# Patient Record
Sex: FEMALE | Race: WHITE | NOT HISPANIC OR LATINO
[De-identification: names, ages, dates, MRNs, and addresses within clinical notes are randomized per-mention and may not be internally consistent; named-entity substitution may affect disease eponyms.]

---

## 2017-01-27 ENCOUNTER — RX RENEWAL (OUTPATIENT)
Age: 27
End: 2017-01-27

## 2017-01-30 ENCOUNTER — RX RENEWAL (OUTPATIENT)
Age: 27
End: 2017-01-30

## 2017-02-08 ENCOUNTER — RX RENEWAL (OUTPATIENT)
Age: 27
End: 2017-02-08

## 2017-03-02 ENCOUNTER — APPOINTMENT (OUTPATIENT)
Dept: FAMILY MEDICINE | Facility: CLINIC | Age: 27
End: 2017-03-02

## 2017-03-02 ENCOUNTER — LABORATORY RESULT (OUTPATIENT)
Age: 27
End: 2017-03-02

## 2017-03-02 VITALS
HEIGHT: 69 IN | SYSTOLIC BLOOD PRESSURE: 132 MMHG | TEMPERATURE: 97.9 F | WEIGHT: 199 LBS | HEART RATE: 119 BPM | BODY MASS INDEX: 29.47 KG/M2 | DIASTOLIC BLOOD PRESSURE: 72 MMHG | OXYGEN SATURATION: 98 % | RESPIRATION RATE: 16 BRPM

## 2017-03-02 DIAGNOSIS — Z13.29 ENCOUNTER FOR SCREENING FOR OTHER SUSPECTED ENDOCRINE DISORDER: ICD-10-CM

## 2017-03-02 DIAGNOSIS — Z13.228 ENCOUNTER FOR SCREENING FOR OTHER SUSPECTED ENDOCRINE DISORDER: ICD-10-CM

## 2017-03-02 DIAGNOSIS — Z13.0 ENCOUNTER FOR SCREENING FOR OTHER SUSPECTED ENDOCRINE DISORDER: ICD-10-CM

## 2017-03-02 DIAGNOSIS — R53.83 OTHER FATIGUE: ICD-10-CM
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
## 2017-03-03 ENCOUNTER — OTHER (OUTPATIENT)
Age: 27
End: 2017-03-03

## 2017-03-06 ENCOUNTER — RX RENEWAL (OUTPATIENT)
Age: 27
End: 2017-03-06

## 2017-03-06 LAB
25(OH)D3 SERPL-MCNC: 17.6 NG/ML
ALBUMIN SERPL ELPH-MCNC: 4.8 G/DL
ALP BLD-CCNC: 63 U/L
ALT SERPL-CCNC: 10 U/L
ANA SER IF-ACNC: NEGATIVE
ANION GAP SERPL CALC-SCNC: 17 MMOL/L
AST SERPL-CCNC: 18 U/L
B BURGDOR IGG+IGM SER QL IB: NORMAL
BASOPHILS # BLD AUTO: 0.03 K/UL
BASOPHILS NFR BLD AUTO: 0.4 %
BILIRUB SERPL-MCNC: 0.2 MG/DL
BUN SERPL-MCNC: 18 MG/DL
CALCIUM SERPL-MCNC: 9.5 MG/DL
CCP AB SER IA-ACNC: <8 UNITS
CHLORIDE SERPL-SCNC: 100 MMOL/L
CHOLEST SERPL-MCNC: 161 MG/DL
CHOLEST/HDLC SERPL: 2.1 RATIO
CO2 SERPL-SCNC: 24 MMOL/L
CREAT SERPL-MCNC: 0.86 MG/DL
EOSINOPHIL # BLD AUTO: 0.01 K/UL
EOSINOPHIL NFR BLD AUTO: 0.1 %
ERYTHROCYTE [SEDIMENTATION RATE] IN BLOOD BY WESTERGREN METHOD: 18 MM/HR
GLUCOSE SERPL-MCNC: 104 MG/DL
HCT VFR BLD CALC: 42.2 %
HDLC SERPL-MCNC: 77 MG/DL
HGB BLD-MCNC: 14.1 G/DL
IMM GRANULOCYTES NFR BLD AUTO: 0.2 %
LDLC SERPL CALC-MCNC: 74 MG/DL
LYMPHOCYTES # BLD AUTO: 1.31 K/UL
LYMPHOCYTES NFR BLD AUTO: 16 %
MAN DIFF?: NORMAL
MCHC RBC-ENTMCNC: 31.5 PG
MCHC RBC-ENTMCNC: 33.4 GM/DL
MCV RBC AUTO: 94.4 FL
MONOCYTES # BLD AUTO: 0.53 K/UL
MONOCYTES NFR BLD AUTO: 6.5 %
NEUTROPHILS # BLD AUTO: 6.31 K/UL
NEUTROPHILS NFR BLD AUTO: 76.8 %
PLATELET # BLD AUTO: 393 K/UL
POTASSIUM SERPL-SCNC: 4.2 MMOL/L
PROT SERPL-MCNC: 8 G/DL
RBC # BLD: 4.47 M/UL
RBC # FLD: 13.4 %
RF+CCP IGG SER-IMP: NEGATIVE
RHEUMATOID FACT SER QL: <7 IU/ML
SODIUM SERPL-SCNC: 141 MMOL/L
T4 SERPL-MCNC: 7.4 UG/DL
TRIGL SERPL-MCNC: 51 MG/DL
TSH SERPL-ACNC: 0.75 UIU/ML
URATE SERPL-MCNC: 2.1 MG/DL
VIT B12 SERPL-MCNC: 581 PG/ML
WBC # FLD AUTO: 8.21 K/UL

## 2017-03-15 ENCOUNTER — RX RENEWAL (OUTPATIENT)
Age: 27
End: 2017-03-15

## 2017-03-27 ENCOUNTER — RX RENEWAL (OUTPATIENT)
Age: 27
End: 2017-03-27

## 2017-04-03 ENCOUNTER — RX RENEWAL (OUTPATIENT)
Age: 27
End: 2017-04-03

## 2017-04-10 ENCOUNTER — RX RENEWAL (OUTPATIENT)
Age: 27
End: 2017-04-10

## 2017-04-24 ENCOUNTER — RX RENEWAL (OUTPATIENT)
Age: 27
End: 2017-04-24

## 2017-05-02 ENCOUNTER — APPOINTMENT (OUTPATIENT)
Dept: FAMILY MEDICINE | Facility: CLINIC | Age: 27
End: 2017-05-02

## 2017-05-02 VITALS
HEIGHT: 69 IN | BODY MASS INDEX: 29.62 KG/M2 | WEIGHT: 200 LBS | SYSTOLIC BLOOD PRESSURE: 140 MMHG | TEMPERATURE: 98.4 F | DIASTOLIC BLOOD PRESSURE: 84 MMHG

## 2017-05-04 ENCOUNTER — RX RENEWAL (OUTPATIENT)
Age: 27
End: 2017-05-04

## 2017-05-07 ENCOUNTER — RX RENEWAL (OUTPATIENT)
Age: 27
End: 2017-05-07

## 2017-05-08 ENCOUNTER — RX RENEWAL (OUTPATIENT)
Age: 27
End: 2017-05-08

## 2017-05-09 ENCOUNTER — TRANSCRIPTION ENCOUNTER (OUTPATIENT)
Age: 27
End: 2017-05-09

## 2017-05-09 ENCOUNTER — RX RENEWAL (OUTPATIENT)
Age: 27
End: 2017-05-09

## 2017-05-10 ENCOUNTER — RX RENEWAL (OUTPATIENT)
Age: 27
End: 2017-05-10

## 2017-05-16 ENCOUNTER — RESULT REVIEW (OUTPATIENT)
Age: 27
End: 2017-05-16

## 2017-05-17 ENCOUNTER — RESULT REVIEW (OUTPATIENT)
Age: 27
End: 2017-05-17

## 2017-05-25 ENCOUNTER — APPOINTMENT (OUTPATIENT)
Dept: FAMILY MEDICINE | Facility: CLINIC | Age: 27
End: 2017-05-25

## 2017-05-25 VITALS
TEMPERATURE: 98.7 F | BODY MASS INDEX: 29.62 KG/M2 | WEIGHT: 200 LBS | HEIGHT: 69 IN | SYSTOLIC BLOOD PRESSURE: 110 MMHG | DIASTOLIC BLOOD PRESSURE: 64 MMHG

## 2017-05-25 DIAGNOSIS — K52.9 NONINFECTIVE GASTROENTERITIS AND COLITIS, UNSPECIFIED: ICD-10-CM

## 2017-06-20 ENCOUNTER — RX RENEWAL (OUTPATIENT)
Age: 27
End: 2017-06-20

## 2017-06-22 ENCOUNTER — RX RENEWAL (OUTPATIENT)
Age: 27
End: 2017-06-22

## 2017-07-05 ENCOUNTER — RESULT REVIEW (OUTPATIENT)
Age: 27
End: 2017-07-05

## 2017-07-12 ENCOUNTER — RX RENEWAL (OUTPATIENT)
Age: 27
End: 2017-07-12

## 2017-07-12 ENCOUNTER — APPOINTMENT (OUTPATIENT)
Dept: FAMILY MEDICINE | Facility: CLINIC | Age: 27
End: 2017-07-12

## 2017-07-12 VITALS
SYSTOLIC BLOOD PRESSURE: 118 MMHG | HEART RATE: 58 BPM | TEMPERATURE: 98.3 F | OXYGEN SATURATION: 97 % | WEIGHT: 200 LBS | BODY MASS INDEX: 29.62 KG/M2 | HEIGHT: 69 IN | DIASTOLIC BLOOD PRESSURE: 72 MMHG

## 2017-07-12 DIAGNOSIS — Z87.09 PERSONAL HISTORY OF OTHER DISEASES OF THE RESPIRATORY SYSTEM: ICD-10-CM

## 2017-07-24 ENCOUNTER — RX RENEWAL (OUTPATIENT)
Age: 27
End: 2017-07-24

## 2017-07-27 ENCOUNTER — RX RENEWAL (OUTPATIENT)
Age: 27
End: 2017-07-27

## 2017-07-28 ENCOUNTER — RX RENEWAL (OUTPATIENT)
Age: 27
End: 2017-07-28

## 2017-07-29 ENCOUNTER — APPOINTMENT (OUTPATIENT)
Dept: FAMILY MEDICINE | Facility: CLINIC | Age: 27
End: 2017-07-29
Payer: COMMERCIAL

## 2017-07-29 VITALS
OXYGEN SATURATION: 98 % | DIASTOLIC BLOOD PRESSURE: 90 MMHG | HEART RATE: 67 BPM | WEIGHT: 190 LBS | HEIGHT: 69 IN | BODY MASS INDEX: 28.14 KG/M2 | SYSTOLIC BLOOD PRESSURE: 120 MMHG

## 2017-07-29 DIAGNOSIS — T07.XXXA UNSPECIFIED MULTIPLE INJURIES, INITIAL ENCOUNTER: ICD-10-CM

## 2017-07-29 PROCEDURE — 99203 OFFICE O/P NEW LOW 30 MIN: CPT

## 2017-07-29 RX ORDER — IBUPROFEN 800 MG/1
800 TABLET, FILM COATED ORAL
Qty: 90 | Refills: 0 | Status: ACTIVE | COMMUNITY
Start: 2017-07-27

## 2017-08-01 ENCOUNTER — RX RENEWAL (OUTPATIENT)
Age: 27
End: 2017-08-01

## 2017-08-06 ENCOUNTER — RX RENEWAL (OUTPATIENT)
Age: 27
End: 2017-08-06

## 2017-08-08 ENCOUNTER — RX RENEWAL (OUTPATIENT)
Age: 27
End: 2017-08-08

## 2017-08-08 DIAGNOSIS — R10.9 UNSPECIFIED ABDOMINAL PAIN: ICD-10-CM

## 2017-08-14 ENCOUNTER — RX RENEWAL (OUTPATIENT)
Age: 27
End: 2017-08-14

## 2017-08-21 ENCOUNTER — RX RENEWAL (OUTPATIENT)
Age: 27
End: 2017-08-21

## 2017-10-13 ENCOUNTER — RX RENEWAL (OUTPATIENT)
Age: 27
End: 2017-10-13

## 2017-10-14 ENCOUNTER — RX RENEWAL (OUTPATIENT)
Age: 27
End: 2017-10-14

## 2017-10-16 ENCOUNTER — RX RENEWAL (OUTPATIENT)
Age: 27
End: 2017-10-16

## 2017-10-30 ENCOUNTER — RX RENEWAL (OUTPATIENT)
Age: 27
End: 2017-10-30

## 2017-12-28 ENCOUNTER — RX RENEWAL (OUTPATIENT)
Age: 27
End: 2017-12-28

## 2018-02-13 ENCOUNTER — RX RENEWAL (OUTPATIENT)
Age: 28
End: 2018-02-13

## 2018-03-19 ENCOUNTER — APPOINTMENT (OUTPATIENT)
Dept: FAMILY MEDICINE | Facility: CLINIC | Age: 28
End: 2018-03-19

## 2018-03-29 ENCOUNTER — RX RENEWAL (OUTPATIENT)
Age: 28
End: 2018-03-29

## 2018-04-13 ENCOUNTER — APPOINTMENT (OUTPATIENT)
Dept: FAMILY MEDICINE | Facility: CLINIC | Age: 28
End: 2018-04-13
Payer: COMMERCIAL

## 2018-04-13 VITALS
OXYGEN SATURATION: 98 % | DIASTOLIC BLOOD PRESSURE: 80 MMHG | TEMPERATURE: 98.2 F | SYSTOLIC BLOOD PRESSURE: 122 MMHG | BODY MASS INDEX: 33.62 KG/M2 | HEIGHT: 69 IN | WEIGHT: 227 LBS | HEART RATE: 68 BPM

## 2018-04-13 DIAGNOSIS — R51 HEADACHE: ICD-10-CM

## 2018-04-13 DIAGNOSIS — Z13.21 ENCOUNTER FOR SCREENING FOR NUTRITIONAL DISORDER: ICD-10-CM

## 2018-04-13 PROCEDURE — 36415 COLL VENOUS BLD VENIPUNCTURE: CPT

## 2018-04-13 PROCEDURE — 99214 OFFICE O/P EST MOD 30 MIN: CPT | Mod: 25

## 2018-04-13 RX ORDER — NAPROXEN 500 MG/1
500 TABLET ORAL
Qty: 60 | Refills: 0 | Status: DISCONTINUED | COMMUNITY
Start: 2017-11-02

## 2018-04-13 RX ORDER — BENZONATATE 100 MG/1
100 CAPSULE ORAL
Qty: 60 | Refills: 0 | Status: DISCONTINUED | COMMUNITY
Start: 2017-12-20

## 2018-04-13 RX ORDER — PREDNISONE 20 MG/1
20 TABLET ORAL
Qty: 10 | Refills: 0 | Status: DISCONTINUED | COMMUNITY
Start: 2017-12-20

## 2018-04-13 RX ORDER — HYDROCORTISONE 25 MG/G
2.5 CREAM TOPICAL
Qty: 28 | Refills: 0 | Status: DISCONTINUED | COMMUNITY
Start: 2017-05-18 | End: 2018-04-13

## 2018-04-13 RX ORDER — METHOCARBAMOL 750 MG/1
750 TABLET, FILM COATED ORAL
Qty: 90 | Refills: 0 | Status: DISCONTINUED | COMMUNITY
Start: 2017-11-02

## 2018-04-13 RX ORDER — OXYCODONE AND ACETAMINOPHEN 5; 325 MG/1; MG/1
5-325 TABLET ORAL
Qty: 20 | Refills: 0 | Status: DISCONTINUED | COMMUNITY
Start: 2017-07-29 | End: 2018-04-13

## 2018-04-17 LAB
ALBUMIN SERPL ELPH-MCNC: 4.4 G/DL
ALP BLD-CCNC: 60 U/L
ALT SERPL-CCNC: 12 U/L
ANION GAP SERPL CALC-SCNC: 15 MMOL/L
APPEARANCE: CLEAR
AST SERPL-CCNC: 18 U/L
BACTERIA: ABNORMAL
BASOPHILS # BLD AUTO: 0.04 K/UL
BASOPHILS NFR BLD AUTO: 0.4 %
BILIRUB SERPL-MCNC: 0.2 MG/DL
BILIRUBIN URINE: NEGATIVE
BLOOD URINE: NEGATIVE
BUN SERPL-MCNC: 22 MG/DL
CALCIUM SERPL-MCNC: 9.6 MG/DL
CHLORIDE SERPL-SCNC: 105 MMOL/L
CHOLEST SERPL-MCNC: 174 MG/DL
CHOLEST/HDLC SERPL: 3.1 RATIO
CO2 SERPL-SCNC: 23 MMOL/L
COLOR: YELLOW
CREAT SERPL-MCNC: 0.72 MG/DL
EOSINOPHIL # BLD AUTO: 0.12 K/UL
EOSINOPHIL NFR BLD AUTO: 1.3 %
GLUCOSE QUALITATIVE U: NEGATIVE MG/DL
GLUCOSE SERPL-MCNC: 91 MG/DL
HBA1C MFR BLD HPLC: 5.6 %
HCT VFR BLD CALC: 43.9 %
HDLC SERPL-MCNC: 57 MG/DL
HGB BLD-MCNC: 13.9 G/DL
HYALINE CASTS: 1 /LPF
IMM GRANULOCYTES NFR BLD AUTO: 0.4 %
KETONES URINE: NEGATIVE
LDLC SERPL CALC-MCNC: 100 MG/DL
LEUKOCYTE ESTERASE URINE: NEGATIVE
LYMPHOCYTES # BLD AUTO: 2.08 K/UL
LYMPHOCYTES NFR BLD AUTO: 23.1 %
MAN DIFF?: NORMAL
MCHC RBC-ENTMCNC: 31.4 PG
MCHC RBC-ENTMCNC: 31.7 GM/DL
MCV RBC AUTO: 99.1 FL
MICROSCOPIC-UA: NORMAL
MONOCYTES # BLD AUTO: 0.71 K/UL
MONOCYTES NFR BLD AUTO: 7.9 %
NEUTROPHILS # BLD AUTO: 6.03 K/UL
NEUTROPHILS NFR BLD AUTO: 66.9 %
NITRITE URINE: NEGATIVE
PH URINE: 5
PLATELET # BLD AUTO: 351 K/UL
POTASSIUM SERPL-SCNC: 4 MMOL/L
PROT SERPL-MCNC: 7.5 G/DL
PROTEIN URINE: NEGATIVE MG/DL
RBC # BLD: 4.43 M/UL
RBC # FLD: 13.8 %
RED BLOOD CELLS URINE: 2 /HPF
SODIUM SERPL-SCNC: 143 MMOL/L
SPECIFIC GRAVITY URINE: 1.02
SQUAMOUS EPITHELIAL CELLS: 8 /HPF
T4 SERPL-MCNC: 6.6 UG/DL
TRIGL SERPL-MCNC: 84 MG/DL
TSH SERPL-ACNC: 0.82 UIU/ML
UROBILINOGEN URINE: NEGATIVE MG/DL
WBC # FLD AUTO: 9.02 K/UL
WHITE BLOOD CELLS URINE: 1 /HPF

## 2018-04-18 ENCOUNTER — RX RENEWAL (OUTPATIENT)
Age: 28
End: 2018-04-18

## 2018-05-15 ENCOUNTER — RX RENEWAL (OUTPATIENT)
Age: 28
End: 2018-05-15

## 2018-07-16 ENCOUNTER — RX RENEWAL (OUTPATIENT)
Age: 28
End: 2018-07-16

## 2018-07-17 ENCOUNTER — RX RENEWAL (OUTPATIENT)
Age: 28
End: 2018-07-17

## 2018-07-18 ENCOUNTER — RX RENEWAL (OUTPATIENT)
Age: 28
End: 2018-07-18

## 2018-07-19 ENCOUNTER — RX RENEWAL (OUTPATIENT)
Age: 28
End: 2018-07-19

## 2018-08-20 ENCOUNTER — RX RENEWAL (OUTPATIENT)
Age: 28
End: 2018-08-20

## 2018-08-22 ENCOUNTER — APPOINTMENT (OUTPATIENT)
Dept: FAMILY MEDICINE | Facility: CLINIC | Age: 28
End: 2018-08-22
Payer: COMMERCIAL

## 2018-08-22 VITALS
DIASTOLIC BLOOD PRESSURE: 70 MMHG | BODY MASS INDEX: 32.14 KG/M2 | OXYGEN SATURATION: 98 % | HEIGHT: 68.5 IN | SYSTOLIC BLOOD PRESSURE: 118 MMHG | WEIGHT: 214.5 LBS | RESPIRATION RATE: 16 BRPM | HEART RATE: 80 BPM

## 2018-08-22 DIAGNOSIS — B00.1 HERPESVIRAL VESICULAR DERMATITIS: ICD-10-CM

## 2018-08-22 PROCEDURE — 99395 PREV VISIT EST AGE 18-39: CPT

## 2018-08-22 RX ORDER — ESCITALOPRAM OXALATE 10 MG/1
10 TABLET ORAL DAILY
Qty: 90 | Refills: 0 | Status: DISCONTINUED | COMMUNITY
Start: 2018-04-13 | End: 2018-08-22

## 2018-08-25 ENCOUNTER — RX RENEWAL (OUTPATIENT)
Age: 28
End: 2018-08-25

## 2018-08-27 ENCOUNTER — RX RENEWAL (OUTPATIENT)
Age: 28
End: 2018-08-27

## 2018-08-27 DIAGNOSIS — Z11.3 ENCOUNTER FOR SCREENING FOR INFECTIONS WITH A PREDOMINANTLY SEXUAL MODE OF TRANSMISSION: ICD-10-CM

## 2019-02-18 ENCOUNTER — TRANSCRIPTION ENCOUNTER (OUTPATIENT)
Age: 29
End: 2019-02-18

## 2019-03-04 ENCOUNTER — RX RENEWAL (OUTPATIENT)
Age: 29
End: 2019-03-04

## 2019-03-11 ENCOUNTER — RX RENEWAL (OUTPATIENT)
Age: 29
End: 2019-03-11

## 2019-04-04 ENCOUNTER — APPOINTMENT (OUTPATIENT)
Dept: FAMILY MEDICINE | Facility: CLINIC | Age: 29
End: 2019-04-04
Payer: COMMERCIAL

## 2019-04-04 VITALS
BODY MASS INDEX: 35.36 KG/M2 | OXYGEN SATURATION: 98 % | HEART RATE: 103 BPM | HEIGHT: 68.5 IN | RESPIRATION RATE: 16 BRPM | WEIGHT: 236 LBS | DIASTOLIC BLOOD PRESSURE: 70 MMHG | TEMPERATURE: 98.5 F | SYSTOLIC BLOOD PRESSURE: 114 MMHG

## 2019-04-04 DIAGNOSIS — Z00.00 ENCOUNTER FOR GENERAL ADULT MEDICAL EXAMINATION W/OUT ABNORMAL FINDINGS: ICD-10-CM

## 2019-04-04 PROCEDURE — G0444 DEPRESSION SCREEN ANNUAL: CPT

## 2019-04-04 PROCEDURE — 99395 PREV VISIT EST AGE 18-39: CPT | Mod: 25

## 2019-04-04 RX ORDER — DOXYCYCLINE HYCLATE 100 MG/1
100 TABLET ORAL
Qty: 20 | Refills: 0 | Status: COMPLETED | COMMUNITY
Start: 2017-05-08 | End: 2019-04-04

## 2019-04-04 NOTE — HISTORY OF PRESENT ILLNESS
[FreeTextEntry1] : CPE [de-identified] : 28 year old female with pmhx of anxiety presents for CPE. Has been followed by psych. however they are not taking her insurance any longer so she will need to get established with new psychiatrist. She has been doing well on her current regimen. Feels well today with no concerns. States she wants to lose weight. She is well informed on proper diet  and exercise regimen.

## 2019-04-04 NOTE — PLAN
[FreeTextEntry1] : Anxiety- refill amitriptyline. f/u with behavioral health to get established with new psychiatrist\par HCM- pap UTD. f/u labs. Cont diet and exercise

## 2019-04-04 NOTE — PHYSICAL EXAM
[No Acute Distress] : no acute distress [Well-Appearing] : well-appearing [Normal Sclera/Conjunctiva] : normal sclera/conjunctiva [PERRL] : pupils equal round and reactive to light [Normal Outer Ear/Nose] : the outer ears and nose were normal in appearance [Normal Oropharynx] : the oropharynx was normal [Supple] : supple [No Lymphadenopathy] : no lymphadenopathy [No Respiratory Distress] : no respiratory distress  [Clear to Auscultation] : lungs were clear to auscultation bilaterally [Normal Rate] : normal rate  [Regular Rhythm] : with a regular rhythm [Soft] : abdomen soft [Non Tender] : non-tender [Non-distended] : non-distended [Normal Bowel Sounds] : normal bowel sounds [No Rash] : no rash [Normal Affect] : the affect was normal [Normal Insight/Judgement] : insight and judgment were intact

## 2019-04-04 NOTE — REVIEW OF SYSTEMS
[Fever] : no fever [Chills] : no chills [Fatigue] : no fatigue [Discharge] : no discharge [Vision Problems] : no vision problems [Earache] : no earache [Nasal Discharge] : no nasal discharge [Sore Throat] : no sore throat [Chest Pain] : no chest pain [Palpitations] : no palpitations [Shortness Of Breath] : no shortness of breath [Wheezing] : no wheezing [Abdominal Pain] : no abdominal pain [Nausea] : no nausea [Vomiting] : no vomiting [Dysuria] : no dysuria [Hematuria] : no hematuria [Itching] : no itching [Skin Rash] : no skin rash [Headache] : no headache [Dizziness] : no dizziness

## 2019-04-04 NOTE — HEALTH RISK ASSESSMENT
[0-5] : 0-5 [0] : 2) Feeling down, depressed, or hopeless: Not at all (0) [Patient reported mammogram was normal] : Patient reported mammogram was normal [] : No [de-identified] : limited [de-identified] : breakfast and lunch are healthy. dinner causes stress eating  [[id number]] : 0 [MammogramDate] : 3/2019

## 2019-04-05 ENCOUNTER — RESULT REVIEW (OUTPATIENT)
Age: 29
End: 2019-04-05

## 2019-04-05 LAB
25(OH)D3 SERPL-MCNC: 15.3 NG/ML
ALBUMIN SERPL ELPH-MCNC: 4.9 G/DL
ALP BLD-CCNC: 71 U/L
ALT SERPL-CCNC: 32 U/L
ANION GAP SERPL CALC-SCNC: 13 MMOL/L
AST SERPL-CCNC: 34 U/L
BASOPHILS # BLD AUTO: 0.08 K/UL
BASOPHILS NFR BLD AUTO: 0.9 %
BILIRUB SERPL-MCNC: 0.4 MG/DL
BUN SERPL-MCNC: 20 MG/DL
CALCIUM SERPL-MCNC: 9.4 MG/DL
CHLORIDE SERPL-SCNC: 100 MMOL/L
CHOLEST SERPL-MCNC: 206 MG/DL
CHOLEST/HDLC SERPL: 2.8 RATIO
CO2 SERPL-SCNC: 25 MMOL/L
CREAT SERPL-MCNC: 0.79 MG/DL
EOSINOPHIL # BLD AUTO: 0.11 K/UL
EOSINOPHIL NFR BLD AUTO: 1.2 %
ESTIMATED AVERAGE GLUCOSE: 117 MG/DL
GLUCOSE SERPL-MCNC: 108 MG/DL
HBA1C MFR BLD HPLC: 5.7 %
HCT VFR BLD CALC: 42.9 %
HDLC SERPL-MCNC: 74 MG/DL
HGB BLD-MCNC: 14.3 G/DL
IMM GRANULOCYTES NFR BLD AUTO: 0.6 %
LDLC SERPL CALC-MCNC: 118 MG/DL
LYMPHOCYTES # BLD AUTO: 2.15 K/UL
LYMPHOCYTES NFR BLD AUTO: 23.7 %
MAN DIFF?: NORMAL
MCHC RBC-ENTMCNC: 30.6 PG
MCHC RBC-ENTMCNC: 33.3 GM/DL
MCV RBC AUTO: 91.7 FL
MONOCYTES # BLD AUTO: 0.92 K/UL
MONOCYTES NFR BLD AUTO: 10.2 %
NEUTROPHILS # BLD AUTO: 5.75 K/UL
NEUTROPHILS NFR BLD AUTO: 63.4 %
PLATELET # BLD AUTO: 361 K/UL
POTASSIUM SERPL-SCNC: 4.6 MMOL/L
PROT SERPL-MCNC: 7.6 G/DL
RBC # BLD: 4.68 M/UL
RBC # FLD: 13.3 %
SODIUM SERPL-SCNC: 138 MMOL/L
TRIGL SERPL-MCNC: 72 MG/DL
TSH SERPL-ACNC: 1.57 UIU/ML
WBC # FLD AUTO: 9.06 K/UL

## 2019-04-05 RX ORDER — ERGOCALCIFEROL 1.25 MG/1
1.25 MG CAPSULE, LIQUID FILLED ORAL
Qty: 12 | Refills: 0 | Status: ACTIVE | COMMUNITY
Start: 2019-04-05 | End: 1900-01-01

## 2019-04-08 ENCOUNTER — RX RENEWAL (OUTPATIENT)
Age: 29
End: 2019-04-08

## 2019-04-17 ENCOUNTER — RX RENEWAL (OUTPATIENT)
Age: 29
End: 2019-04-17

## 2019-04-18 ENCOUNTER — RX RENEWAL (OUTPATIENT)
Age: 29
End: 2019-04-18

## 2019-04-18 ENCOUNTER — RX CHANGE (OUTPATIENT)
Age: 29
End: 2019-04-18

## 2019-04-18 RX ORDER — CRISABOROLE 20 MG/G
2 OINTMENT TOPICAL
Qty: 1 | Refills: 1 | Status: ACTIVE | COMMUNITY
Start: 2019-04-17 | End: 1900-01-01

## 2019-04-18 RX ORDER — ELETRIPTAN HYDROBROMIDE 40 MG/1
40 TABLET, FILM COATED ORAL
Qty: 9 | Refills: 0 | Status: ACTIVE | COMMUNITY
Start: 2019-04-18 | End: 1900-01-01

## 2019-04-18 RX ORDER — HYDROCORTISONE BUTYRATE 1 MG/G
0.1 CREAM TOPICAL TWICE DAILY
Qty: 1 | Refills: 1 | Status: ACTIVE | COMMUNITY
Start: 2019-04-18 | End: 1900-01-01

## 2019-06-04 ENCOUNTER — RX RENEWAL (OUTPATIENT)
Age: 29
End: 2019-06-04

## 2019-06-20 ENCOUNTER — RX RENEWAL (OUTPATIENT)
Age: 29
End: 2019-06-20

## 2019-06-29 ENCOUNTER — APPOINTMENT (OUTPATIENT)
Dept: FAMILY MEDICINE | Facility: CLINIC | Age: 29
End: 2019-06-29
Payer: COMMERCIAL

## 2019-06-29 VITALS
HEART RATE: 100 BPM | HEIGHT: 68.5 IN | OXYGEN SATURATION: 98 % | SYSTOLIC BLOOD PRESSURE: 116 MMHG | TEMPERATURE: 98.4 F | DIASTOLIC BLOOD PRESSURE: 88 MMHG | BODY MASS INDEX: 35.96 KG/M2 | RESPIRATION RATE: 16 BRPM | WEIGHT: 240 LBS

## 2019-06-29 DIAGNOSIS — Z87.09 PERSONAL HISTORY OF OTHER DISEASES OF THE RESPIRATORY SYSTEM: ICD-10-CM

## 2019-06-29 PROCEDURE — 99214 OFFICE O/P EST MOD 30 MIN: CPT

## 2019-06-29 RX ORDER — FLUTICASONE PROPIONATE 50 UG/1
50 SPRAY, METERED NASAL TWICE DAILY
Qty: 1 | Refills: 0 | Status: ACTIVE | COMMUNITY
Start: 2019-06-29 | End: 1900-01-01

## 2019-06-29 RX ORDER — AMITRIPTYLINE HYDROCHLORIDE 25 MG/1
25 TABLET, FILM COATED ORAL
Qty: 90 | Refills: 0 | Status: DISCONTINUED | COMMUNITY
Start: 2019-04-04 | End: 2019-06-29

## 2019-06-29 NOTE — ASSESSMENT
[FreeTextEntry1] : Sinusitis- take Augmentin as directed with food, over the counter probiotics, use Fluticasone nasal spray as directed\par Anxiety- c/w Amitriptyline, Effexor, patient seeking psychiatrist \par \par return or call if symptoms worsen or don't improve

## 2019-06-29 NOTE — HISTORY OF PRESENT ILLNESS
[FreeTextEntry8] : \par 28 year old female presents c/o 3 day h/o sinus congestion and pressure, cough\par cough has become productive with green sputum\par no fever\par + ear pain\par yesterday took Aleve w/o much improvement \par \par has anxiety- on Amitriptyline 50 mg day daily along with Effexor 150 daily, needs refill for Amitriptyline \par has not been able to establish with psychiatry yet \par does follow with a therapist

## 2019-06-29 NOTE — PHYSICAL EXAM
[No Acute Distress] : no acute distress [Well Nourished] : well nourished [Well Developed] : well developed [Normal TMs] : both tympanic membranes were normal [Normal Appearance] : was normal in appearance [Neck Supple] : was supple [No Respiratory Distress] : no respiratory distress  [Clear to Auscultation] : lungs were clear to auscultation bilaterally [Normal Rate] : normal rate  [Regular Rhythm] : with a regular rhythm [Normal S1, S2] : normal S1 and S2 [No Murmur] : no murmur heard [Normal Posterior Cervical Nodes] : no posterior cervical lymphadenopathy [Normal Anterior Cervical Nodes] : no anterior cervical lymphadenopathy [Alert and Oriented x3] : oriented to person, place, and time [de-identified] : Bilateral nasal congestion; Bilateral frontal and maxillary sinus tenderness; Oropharynx with mild erythema, no exudates, postnasal drip present

## 2019-08-01 ENCOUNTER — RX RENEWAL (OUTPATIENT)
Age: 29
End: 2019-08-01

## 2019-08-23 ENCOUNTER — RX RENEWAL (OUTPATIENT)
Age: 29
End: 2019-08-23

## 2019-10-03 ENCOUNTER — RX RENEWAL (OUTPATIENT)
Age: 29
End: 2019-10-03

## 2019-10-03 ENCOUNTER — APPOINTMENT (OUTPATIENT)
Dept: FAMILY MEDICINE | Facility: CLINIC | Age: 29
End: 2019-10-03
Payer: COMMERCIAL

## 2019-10-03 VITALS
WEIGHT: 240 LBS | TEMPERATURE: 98.5 F | OXYGEN SATURATION: 97 % | BODY MASS INDEX: 35.96 KG/M2 | SYSTOLIC BLOOD PRESSURE: 110 MMHG | RESPIRATION RATE: 16 BRPM | HEIGHT: 68.5 IN | HEART RATE: 103 BPM | DIASTOLIC BLOOD PRESSURE: 80 MMHG

## 2019-10-03 DIAGNOSIS — R05 COUGH: ICD-10-CM

## 2019-10-03 PROCEDURE — 99213 OFFICE O/P EST LOW 20 MIN: CPT | Mod: 25

## 2019-10-03 PROCEDURE — 90686 IIV4 VACC NO PRSV 0.5 ML IM: CPT

## 2019-10-03 PROCEDURE — G0008: CPT

## 2019-10-03 RX ORDER — AMOXICILLIN AND CLAVULANATE POTASSIUM 875; 125 MG/1; MG/1
875-125 TABLET, COATED ORAL
Qty: 14 | Refills: 0 | Status: DISCONTINUED | COMMUNITY
Start: 2019-06-29 | End: 2019-10-03

## 2019-10-03 NOTE — REVIEW OF SYSTEMS
[Fever] : fever [Chills] : no chills [Wheezing] : wheezing [Cough] : cough [Negative] : Cardiovascular

## 2019-10-03 NOTE — PHYSICAL EXAM
[Normal] : normal rate, regular rhythm, normal S1 and S2 and no murmur heard [de-identified] : no wheezing at this time
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
## 2019-10-22 ENCOUNTER — RX RENEWAL (OUTPATIENT)
Age: 29
End: 2019-10-22

## 2019-10-23 ENCOUNTER — RX RENEWAL (OUTPATIENT)
Age: 29
End: 2019-10-23

## 2019-10-25 ENCOUNTER — RX RENEWAL (OUTPATIENT)
Age: 29
End: 2019-10-25

## 2019-12-01 ENCOUNTER — RX RENEWAL (OUTPATIENT)
Age: 29
End: 2019-12-01

## 2019-12-02 ENCOUNTER — RX RENEWAL (OUTPATIENT)
Age: 29
End: 2019-12-02

## 2019-12-10 ENCOUNTER — RX RENEWAL (OUTPATIENT)
Age: 29
End: 2019-12-10

## 2020-01-06 ENCOUNTER — RX RENEWAL (OUTPATIENT)
Age: 30
End: 2020-01-06

## 2020-01-24 ENCOUNTER — RX RENEWAL (OUTPATIENT)
Age: 30
End: 2020-01-24

## 2020-03-02 ENCOUNTER — RX RENEWAL (OUTPATIENT)
Age: 30
End: 2020-03-02

## 2020-03-07 ENCOUNTER — APPOINTMENT (OUTPATIENT)
Dept: FAMILY MEDICINE | Facility: CLINIC | Age: 30
End: 2020-03-07
Payer: COMMERCIAL

## 2020-03-07 VITALS
WEIGHT: 240 LBS | HEART RATE: 106 BPM | DIASTOLIC BLOOD PRESSURE: 88 MMHG | HEIGHT: 69 IN | SYSTOLIC BLOOD PRESSURE: 126 MMHG | BODY MASS INDEX: 35.55 KG/M2 | OXYGEN SATURATION: 98 %

## 2020-03-07 DIAGNOSIS — Z13.1 ENCOUNTER FOR SCREENING FOR DIABETES MELLITUS: ICD-10-CM

## 2020-03-07 DIAGNOSIS — E55.9 VITAMIN D DEFICIENCY, UNSPECIFIED: ICD-10-CM

## 2020-03-07 DIAGNOSIS — G43.909 MIGRAINE, UNSPECIFIED, NOT INTRACTABLE, W/OUT STATUS MIGRAINOSUS: ICD-10-CM

## 2020-03-07 DIAGNOSIS — Z13.29 ENCOUNTER FOR SCREENING FOR OTHER SUSPECTED ENDOCRINE DISORDER: ICD-10-CM

## 2020-03-07 DIAGNOSIS — K21.9 GASTRO-ESOPHAGEAL REFLUX DISEASE W/OUT ESOPHAGITIS: ICD-10-CM

## 2020-03-07 DIAGNOSIS — E66.9 OBESITY, UNSPECIFIED: ICD-10-CM

## 2020-03-07 DIAGNOSIS — Z13.220 ENCOUNTER FOR SCREENING FOR LIPOID DISORDERS: ICD-10-CM

## 2020-03-07 PROCEDURE — 99214 OFFICE O/P EST MOD 30 MIN: CPT | Mod: 25

## 2020-03-07 PROCEDURE — 36415 COLL VENOUS BLD VENIPUNCTURE: CPT

## 2020-03-07 RX ORDER — BENZONATATE 200 MG/1
200 CAPSULE ORAL
Qty: 20 | Refills: 2 | Status: DISCONTINUED | COMMUNITY
Start: 2019-10-03 | End: 2020-03-07

## 2020-03-07 RX ORDER — ACETAMINOPHEN AND CODEINE PHOSPHATE 300; 60 MG/1; MG/1
300-60 TABLET ORAL
Qty: 90 | Refills: 0 | Status: DISCONTINUED | COMMUNITY
Start: 2019-10-30

## 2020-03-07 RX ORDER — BUTALBITAL, ACETAMINOPHEN AND CAFFEINE 50; 325; 40 MG/1; MG/1; MG/1
50-325-40 CAPSULE ORAL EVERY 8 HOURS
Qty: 30 | Refills: 1 | Status: DISCONTINUED | COMMUNITY
Start: 2019-08-23 | End: 2020-03-07

## 2020-03-07 RX ORDER — HYDROCODONE BITARTRATE AND HOMATROPINE METHYLBROMIDE 5; 1.5 MG/5ML; MG/5ML
5-1.5 SYRUP ORAL EVERY 6 HOURS
Qty: 120 | Refills: 0 | Status: DISCONTINUED | COMMUNITY
Start: 2019-10-03 | End: 2020-03-07

## 2020-03-07 RX ORDER — CHLORZOXAZONE 500 MG/1
500 TABLET ORAL
Qty: 120 | Refills: 0 | Status: DISCONTINUED | COMMUNITY
Start: 2020-02-20

## 2020-03-07 RX ORDER — NAPROXEN AND ESOMEPRAZOLE MAGNESIUM 500; 20 MG/1; MG/1
500-20 TABLET, DELAYED RELEASE ORAL
Qty: 60 | Refills: 0 | Status: DISCONTINUED | COMMUNITY
Start: 2020-01-09

## 2020-03-07 RX ORDER — AZITHROMYCIN 250 MG/1
250 TABLET, FILM COATED ORAL
Qty: 1 | Refills: 0 | Status: DISCONTINUED | COMMUNITY
Start: 2019-10-03 | End: 2020-03-07

## 2020-03-07 RX ORDER — TAPENTADOL HYDROCHLORIDE 100 MG/1
100 TABLET, FILM COATED ORAL
Qty: 90 | Refills: 0 | Status: DISCONTINUED | COMMUNITY
Start: 2019-09-09

## 2020-03-07 RX ORDER — METHYLPREDNISOLONE 4 MG/1
4 TABLET ORAL
Qty: 1 | Refills: 0 | Status: DISCONTINUED | COMMUNITY
Start: 2019-10-03 | End: 2020-03-07

## 2020-03-07 RX ORDER — TAPENTADOL HYDROCHLORIDE 150 MG/1
150 TABLET, FILM COATED, EXTENDED RELEASE ORAL
Qty: 60 | Refills: 0 | Status: DISCONTINUED | COMMUNITY
Start: 2019-12-24

## 2020-03-07 NOTE — HISTORY OF PRESENT ILLNESS
[FreeTextEntry8] : Needs refill of medication. She had been seeing psychiatry. Not going back to the practice she was in. Increased dose of venlafaxine to 75 mg. She is looking for new psych care. \par Diagnosed with cyclic vomiting syndrome by GI. Continues to have episodic vomiting, no diarrhea. It occurs every 3-6 months.\par Gaining weight and not eating healthy. Feels she will get back on track with refill of medication.\par Last bw abnormal Vitamin D. She is worried about her glucose. \par Sleeping OK, still taking amitriptyline. Migraines controlled with  Relpax. No longer taking butalbital.

## 2020-03-07 NOTE — PLAN
[FreeTextEntry1] : Venlafaxine refilled. Needs to follow up with psych. Follow up with mental health coordinator in office. She will make appointment. \par Start healthy eating and more exercise. \par

## 2020-03-10 LAB
25(OH)D3 SERPL-MCNC: 47.8 NG/ML
ALBUMIN SERPL ELPH-MCNC: 5 G/DL
ALP BLD-CCNC: 94 U/L
ALT SERPL-CCNC: 39 U/L
ANION GAP SERPL CALC-SCNC: 15 MMOL/L
APPEARANCE: CLEAR
AST SERPL-CCNC: 25 U/L
BACTERIA: NEGATIVE
BASOPHILS # BLD AUTO: 0.07 K/UL
BASOPHILS NFR BLD AUTO: 0.6 %
BILIRUB SERPL-MCNC: 0.2 MG/DL
BILIRUBIN URINE: NEGATIVE
BLOOD URINE: NEGATIVE
BUN SERPL-MCNC: 23 MG/DL
CALCIUM SERPL-MCNC: 10.2 MG/DL
CHLORIDE SERPL-SCNC: 102 MMOL/L
CHOLEST SERPL-MCNC: 236 MG/DL
CHOLEST/HDLC SERPL: 3.3 RATIO
CO2 SERPL-SCNC: 24 MMOL/L
COLOR: NORMAL
CREAT SERPL-MCNC: 0.76 MG/DL
EOSINOPHIL # BLD AUTO: 0.18 K/UL
EOSINOPHIL NFR BLD AUTO: 1.4 %
ESTIMATED AVERAGE GLUCOSE: 114 MG/DL
GLUCOSE QUALITATIVE U: NEGATIVE
GLUCOSE SERPL-MCNC: 108 MG/DL
HBA1C MFR BLD HPLC: 5.6 %
HCT VFR BLD CALC: 48.3 %
HDLC SERPL-MCNC: 72 MG/DL
HGB BLD-MCNC: 14.9 G/DL
HYALINE CASTS: 1 /LPF
IMM GRANULOCYTES NFR BLD AUTO: 0.5 %
KETONES URINE: NEGATIVE
LDLC SERPL CALC-MCNC: 137 MG/DL
LEUKOCYTE ESTERASE URINE: NEGATIVE
LYMPHOCYTES # BLD AUTO: 3.45 K/UL
LYMPHOCYTES NFR BLD AUTO: 27.4 %
MAN DIFF?: NORMAL
MCHC RBC-ENTMCNC: 29.5 PG
MCHC RBC-ENTMCNC: 30.8 GM/DL
MCV RBC AUTO: 95.6 FL
MICROSCOPIC-UA: NORMAL
MONOCYTES # BLD AUTO: 1.07 K/UL
MONOCYTES NFR BLD AUTO: 8.5 %
NEUTROPHILS # BLD AUTO: 7.74 K/UL
NEUTROPHILS NFR BLD AUTO: 61.6 %
NITRITE URINE: NEGATIVE
PH URINE: 5.5
PLATELET # BLD AUTO: 454 K/UL
POTASSIUM SERPL-SCNC: 4.6 MMOL/L
PROT SERPL-MCNC: 7.6 G/DL
PROTEIN URINE: NEGATIVE
RBC # BLD: 5.05 M/UL
RBC # FLD: 13.9 %
RED BLOOD CELLS URINE: 5 /HPF
SODIUM SERPL-SCNC: 142 MMOL/L
SPECIFIC GRAVITY URINE: 1.02
SQUAMOUS EPITHELIAL CELLS: 4 /HPF
T4 SERPL-MCNC: 8.7 UG/DL
TRIGL SERPL-MCNC: 133 MG/DL
TSH SERPL-ACNC: 1.66 UIU/ML
UROBILINOGEN URINE: NORMAL
WBC # FLD AUTO: 12.57 K/UL
WHITE BLOOD CELLS URINE: 1 /HPF

## 2020-03-21 ENCOUNTER — TRANSCRIPTION ENCOUNTER (OUTPATIENT)
Age: 30
End: 2020-03-21

## 2020-03-23 ENCOUNTER — TRANSCRIPTION ENCOUNTER (OUTPATIENT)
Age: 30
End: 2020-03-23

## 2020-03-23 RX ORDER — AMITRIPTYLINE HYDROCHLORIDE 50 MG/1
50 TABLET, FILM COATED ORAL
Qty: 90 | Refills: 0 | Status: ACTIVE | COMMUNITY
Start: 2019-06-29 | End: 1900-01-01

## 2020-03-26 ENCOUNTER — RX RENEWAL (OUTPATIENT)
Age: 30
End: 2020-03-26

## 2020-04-02 PROBLEM — T07.XXXA MULTIPLE TRAUMATIC INJURIES: Status: ACTIVE | Noted: 2017-07-29

## 2020-04-08 ENCOUNTER — APPOINTMENT (OUTPATIENT)
Dept: FAMILY MEDICINE | Facility: CLINIC | Age: 30
End: 2020-04-08
Payer: COMMERCIAL

## 2020-04-08 PROCEDURE — G2012 BRIEF CHECK IN BY MD/QHP: CPT

## 2020-04-13 ENCOUNTER — TRANSCRIPTION ENCOUNTER (OUTPATIENT)
Age: 30
End: 2020-04-13

## 2020-04-13 DIAGNOSIS — R79.89 OTHER SPECIFIED ABNORMAL FINDINGS OF BLOOD CHEMISTRY: ICD-10-CM

## 2020-04-24 ENCOUNTER — TRANSCRIPTION ENCOUNTER (OUTPATIENT)
Age: 30
End: 2020-04-24

## 2020-04-27 ENCOUNTER — RX RENEWAL (OUTPATIENT)
Age: 30
End: 2020-04-27

## 2020-05-14 ENCOUNTER — RX RENEWAL (OUTPATIENT)
Age: 30
End: 2020-05-14

## 2020-05-26 ENCOUNTER — TRANSCRIPTION ENCOUNTER (OUTPATIENT)
Age: 30
End: 2020-05-26

## 2020-05-26 ENCOUNTER — APPOINTMENT (OUTPATIENT)
Dept: FAMILY MEDICINE | Facility: CLINIC | Age: 30
End: 2020-05-26
Payer: COMMERCIAL

## 2020-05-26 DIAGNOSIS — G47.00 INSOMNIA, UNSPECIFIED: ICD-10-CM

## 2020-05-26 DIAGNOSIS — F41.9 ANXIETY DISORDER, UNSPECIFIED: ICD-10-CM

## 2020-05-26 PROCEDURE — 99213 OFFICE O/P EST LOW 20 MIN: CPT | Mod: 95

## 2020-05-27 ENCOUNTER — TRANSCRIPTION ENCOUNTER (OUTPATIENT)
Age: 30
End: 2020-05-27

## 2020-06-08 ENCOUNTER — RX RENEWAL (OUTPATIENT)
Age: 30
End: 2020-06-08

## 2020-06-18 ENCOUNTER — RX RENEWAL (OUTPATIENT)
Age: 30
End: 2020-06-18

## 2020-07-27 ENCOUNTER — TRANSCRIPTION ENCOUNTER (OUTPATIENT)
Age: 30
End: 2020-07-27

## 2020-08-03 ENCOUNTER — TRANSCRIPTION ENCOUNTER (OUTPATIENT)
Age: 30
End: 2020-08-03

## 2020-08-10 ENCOUNTER — TRANSCRIPTION ENCOUNTER (OUTPATIENT)
Age: 30
End: 2020-08-10

## 2020-08-10 ENCOUNTER — RX RENEWAL (OUTPATIENT)
Age: 30
End: 2020-08-10

## 2020-08-10 RX ORDER — PIMECROLIMUS 10 MG/G
1 CREAM TOPICAL
Qty: 3 | Refills: 3 | Status: ACTIVE | COMMUNITY
Start: 2017-07-12 | End: 1900-01-01

## 2020-08-10 RX ORDER — PIMECROLIMUS 10 MG/G
1 CREAM TOPICAL
Qty: 3 | Refills: 3 | Status: ACTIVE | COMMUNITY
Start: 2020-08-10 | End: 1900-01-01

## 2020-09-08 ENCOUNTER — RX RENEWAL (OUTPATIENT)
Age: 30
End: 2020-09-08

## 2020-10-06 ENCOUNTER — TRANSCRIPTION ENCOUNTER (OUTPATIENT)
Age: 30
End: 2020-10-06

## 2020-10-06 RX ORDER — ALBUTEROL SULFATE 90 UG/1
108 (90 BASE) POWDER, METERED RESPIRATORY (INHALATION)
Qty: 1 | Refills: 3 | Status: ACTIVE | COMMUNITY
Start: 2017-01-27 | End: 1900-01-01

## 2020-10-07 ENCOUNTER — RX RENEWAL (OUTPATIENT)
Age: 30
End: 2020-10-07

## 2020-10-08 ENCOUNTER — RX RENEWAL (OUTPATIENT)
Age: 30
End: 2020-10-08

## 2020-10-21 RX ORDER — DIAZEPAM 10 MG/1
10 TABLET ORAL
Qty: 45 | Refills: 0 | Status: ACTIVE | COMMUNITY
Start: 2020-04-08 | End: 1900-01-01

## 2020-11-09 ENCOUNTER — RX RENEWAL (OUTPATIENT)
Age: 30
End: 2020-11-09

## 2020-11-09 RX ORDER — ELETRIPTAN HYDROBROMIDE 40 MG/1
40 TABLET, FILM COATED ORAL
Qty: 6 | Refills: 0 | Status: ACTIVE | COMMUNITY
Start: 2019-04-18 | End: 1900-01-01

## 2020-11-11 ENCOUNTER — TRANSCRIPTION ENCOUNTER (OUTPATIENT)
Age: 30
End: 2020-11-11

## 2020-11-24 ENCOUNTER — RX RENEWAL (OUTPATIENT)
Age: 30
End: 2020-11-24

## 2020-11-25 ENCOUNTER — APPOINTMENT (OUTPATIENT)
Dept: FAMILY MEDICINE | Facility: CLINIC | Age: 30
End: 2020-11-25

## 2020-12-21 PROBLEM — Z87.09 HISTORY OF ACUTE SINUSITIS: Status: RESOLVED | Noted: 2019-06-29 | Resolved: 2020-12-21

## 2021-01-07 ENCOUNTER — RX RENEWAL (OUTPATIENT)
Age: 31
End: 2021-01-07

## 2021-01-11 ENCOUNTER — RX RENEWAL (OUTPATIENT)
Age: 31
End: 2021-01-11

## 2021-02-09 ENCOUNTER — RX RENEWAL (OUTPATIENT)
Age: 31
End: 2021-02-09

## 2021-03-02 ENCOUNTER — APPOINTMENT (OUTPATIENT)
Dept: FAMILY MEDICINE | Facility: CLINIC | Age: 31
End: 2021-03-02

## 2021-03-09 ENCOUNTER — RX RENEWAL (OUTPATIENT)
Age: 31
End: 2021-03-09

## 2021-04-28 ENCOUNTER — RX RENEWAL (OUTPATIENT)
Age: 31
End: 2021-04-28

## 2021-05-24 ENCOUNTER — RX RENEWAL (OUTPATIENT)
Age: 31
End: 2021-05-24

## 2021-05-24 RX ORDER — ONDANSETRON 4 MG/1
4 TABLET, ORALLY DISINTEGRATING ORAL
Qty: 24 | Refills: 2 | Status: ACTIVE | COMMUNITY
Start: 2017-05-25 | End: 1900-01-01

## 2022-12-22 ENCOUNTER — APPOINTMENT (OUTPATIENT)
Dept: ORTHOPEDIC SURGERY | Facility: CLINIC | Age: 32
End: 2022-12-22
Payer: COMMERCIAL

## 2022-12-22 DIAGNOSIS — M92.71 JUVENILE OSTEOCHONDROSIS OF METATARSUS, RIGHT FOOT: ICD-10-CM

## 2022-12-22 PROCEDURE — 99203 OFFICE O/P NEW LOW 30 MIN: CPT

## 2022-12-22 PROCEDURE — 73630 X-RAY EXAM OF FOOT: CPT | Mod: RT

## 2022-12-22 NOTE — PHYSICAL EXAM
[Mild] : mild swelling of toe(s) [2nd] : 2nd [3rd] : 3rd [NL (40)] : plantar flexion 40 degrees [NL 30)] : inversion 30 degrees [NL (20)] : eversion 20 degrees [5___] : [facility] 5[unfilled]/5 [1+] : posterior tibialis pulse: 1+ [] : patient ambulates without assistive device [Right] : right foot [There are no fractures, subluxations or dislocations. No significant abnormalities are seen] : There are no fractures, subluxations or dislocations. No significant abnormalities are seen [de-identified] : squaring of 2nd mt head

## 2022-12-22 NOTE — HISTORY OF PRESENT ILLNESS
[Sudden] : sudden [10] : 10 [0] : 0 [Sharp] : sharp [Intermittent] : intermittent [Household chores] : household chores [Leisure] : leisure [Social interactions] : social interactions [Rest] : rest [Full time] : Work status: full time [de-identified] : Patient is here for her right foot. Patient states NKI. Patient states pain stared 2 weeks ago. Patient states the pain is sharp when weight bearing and during ROM. Patient states the pain is by the 2nd and 3rd metatarsals.  [] : Post Surgical Visit: no [FreeTextEntry1] : Right foot [FreeTextEntry3] : 2 weeks ago  [FreeTextEntry5] : Patient states NKI [de-identified] : Movement  [de-identified] : Pharmacy Liaison

## 2022-12-22 NOTE — ASSESSMENT
[FreeTextEntry1] : 33yo female with right foot pain along 2nd metatarsal head.  xrays show mild freibergs disease.  Metatarsalgia.\par 1.  nonsurgical management\par 2.  orthotics, metatarsal pads\par 3.  nsaids prn\par 4.  activity modifications\par 5.  f/u prn

## 2022-12-23 ENCOUNTER — TRANSCRIPTION ENCOUNTER (OUTPATIENT)
Age: 32
End: 2022-12-23